# Patient Record
Sex: FEMALE | Race: WHITE | NOT HISPANIC OR LATINO
[De-identification: names, ages, dates, MRNs, and addresses within clinical notes are randomized per-mention and may not be internally consistent; named-entity substitution may affect disease eponyms.]

---

## 2017-08-21 ENCOUNTER — RESULT REVIEW (OUTPATIENT)
Age: 62
End: 2017-08-21

## 2017-08-22 ENCOUNTER — APPOINTMENT (OUTPATIENT)
Dept: OBGYN | Facility: CLINIC | Age: 62
End: 2017-08-22
Payer: COMMERCIAL

## 2017-08-22 ENCOUNTER — RESULT REVIEW (OUTPATIENT)
Age: 62
End: 2017-08-22

## 2017-08-22 PROCEDURE — 58100 BIOPSY OF UTERUS LINING: CPT

## 2018-01-09 ENCOUNTER — APPOINTMENT (OUTPATIENT)
Dept: MRI IMAGING | Facility: CLINIC | Age: 63
End: 2018-01-09
Payer: COMMERCIAL

## 2018-01-09 ENCOUNTER — OUTPATIENT (OUTPATIENT)
Dept: OUTPATIENT SERVICES | Facility: HOSPITAL | Age: 63
LOS: 1 days | End: 2018-01-09
Payer: COMMERCIAL

## 2018-01-09 DIAGNOSIS — R93.0 ABNORMAL FINDINGS ON DIAGNOSTIC IMAGING OF SKULL AND HEAD, NOT ELSEWHERE CLASSIFIED: ICD-10-CM

## 2018-01-09 DIAGNOSIS — Z98.89 OTHER SPECIFIED POSTPROCEDURAL STATES: Chronic | ICD-10-CM

## 2018-01-09 DIAGNOSIS — D25.9 LEIOMYOMA OF UTERUS, UNSPECIFIED: Chronic | ICD-10-CM

## 2018-01-09 DIAGNOSIS — Z00.8 ENCOUNTER FOR OTHER GENERAL EXAMINATION: ICD-10-CM

## 2018-01-09 PROCEDURE — 82565 ASSAY OF CREATININE: CPT

## 2018-01-09 PROCEDURE — 72197 MRI PELVIS W/O & W/DYE: CPT | Mod: 26

## 2018-01-09 PROCEDURE — A9585: CPT

## 2018-01-09 PROCEDURE — 72197 MRI PELVIS W/O & W/DYE: CPT

## 2018-01-30 ENCOUNTER — APPOINTMENT (OUTPATIENT)
Dept: OBGYN | Facility: CLINIC | Age: 63
End: 2018-01-30

## 2018-02-06 ENCOUNTER — RESULT REVIEW (OUTPATIENT)
Age: 63
End: 2018-02-06

## 2018-02-06 ENCOUNTER — APPOINTMENT (OUTPATIENT)
Dept: OBGYN | Facility: CLINIC | Age: 63
End: 2018-02-06
Payer: COMMERCIAL

## 2018-02-06 PROCEDURE — 58100 BIOPSY OF UTERUS LINING: CPT

## 2018-02-06 PROCEDURE — 58340 CATHETER FOR HYSTEROGRAPHY: CPT

## 2018-02-06 PROCEDURE — 76831 ECHO EXAM UTERUS: CPT

## 2018-11-12 ENCOUNTER — RESULT REVIEW (OUTPATIENT)
Age: 63
End: 2018-11-12

## 2018-11-13 ENCOUNTER — APPOINTMENT (OUTPATIENT)
Dept: OBGYN | Facility: CLINIC | Age: 63
End: 2018-11-13
Payer: COMMERCIAL

## 2018-11-13 PROCEDURE — 99396 PREV VISIT EST AGE 40-64: CPT

## 2019-07-18 ENCOUNTER — FORM ENCOUNTER (OUTPATIENT)
Age: 64
End: 2019-07-18

## 2019-08-06 ENCOUNTER — FORM ENCOUNTER (OUTPATIENT)
Age: 64
End: 2019-08-06

## 2019-08-21 ENCOUNTER — FORM ENCOUNTER (OUTPATIENT)
Age: 64
End: 2019-08-21

## 2019-12-02 ENCOUNTER — TRANSCRIPTION ENCOUNTER (OUTPATIENT)
Age: 64
End: 2019-12-02

## 2020-01-23 ENCOUNTER — APPOINTMENT (OUTPATIENT)
Dept: OBGYN | Facility: CLINIC | Age: 65
End: 2020-01-23
Payer: COMMERCIAL

## 2020-01-23 ENCOUNTER — RESULT REVIEW (OUTPATIENT)
Age: 65
End: 2020-01-23

## 2020-01-23 PROCEDURE — 99396 PREV VISIT EST AGE 40-64: CPT

## 2020-07-14 ENCOUNTER — FORM ENCOUNTER (OUTPATIENT)
Age: 65
End: 2020-07-14

## 2021-01-27 ENCOUNTER — FORM ENCOUNTER (OUTPATIENT)
Age: 66
End: 2021-01-27

## 2021-01-28 ENCOUNTER — APPOINTMENT (OUTPATIENT)
Dept: OBGYN | Facility: CLINIC | Age: 66
End: 2021-01-28
Payer: COMMERCIAL

## 2021-01-28 ENCOUNTER — RESULT REVIEW (OUTPATIENT)
Age: 66
End: 2021-01-28

## 2021-01-28 PROCEDURE — 99396 PREV VISIT EST AGE 40-64: CPT

## 2021-01-28 PROCEDURE — 99072 ADDL SUPL MATRL&STAF TM PHE: CPT

## 2021-02-02 ENCOUNTER — FORM ENCOUNTER (OUTPATIENT)
Age: 66
End: 2021-02-02

## 2021-02-04 ENCOUNTER — FORM ENCOUNTER (OUTPATIENT)
Age: 66
End: 2021-02-04

## 2021-07-20 ENCOUNTER — TRANSCRIPTION ENCOUNTER (OUTPATIENT)
Age: 66
End: 2021-07-20

## 2021-07-25 ENCOUNTER — TRANSCRIPTION ENCOUNTER (OUTPATIENT)
Age: 66
End: 2021-07-25

## 2022-02-08 ENCOUNTER — NON-APPOINTMENT (OUTPATIENT)
Age: 67
End: 2022-02-08

## 2022-02-08 DIAGNOSIS — Z78.9 OTHER SPECIFIED HEALTH STATUS: ICD-10-CM

## 2022-02-08 DIAGNOSIS — Z82.62 FAMILY HISTORY OF OSTEOPOROSIS: ICD-10-CM

## 2022-02-08 DIAGNOSIS — Z82.49 FAMILY HISTORY OF ISCHEMIC HEART DISEASE AND OTHER DISEASES OF THE CIRCULATORY SYSTEM: ICD-10-CM

## 2022-02-08 DIAGNOSIS — M91.10 JUVENILE OSTEOCHONDROSIS OF HEAD OF FEMUR [LEGG-CALVE-PERTHES], UNSPECIFIED LEG: ICD-10-CM

## 2022-02-08 DIAGNOSIS — Z98.890 OTHER SPECIFIED POSTPROCEDURAL STATES: ICD-10-CM

## 2022-02-08 DIAGNOSIS — Z92.89 PERSONAL HISTORY OF OTHER MEDICAL TREATMENT: ICD-10-CM

## 2022-02-08 DIAGNOSIS — Z80.3 FAMILY HISTORY OF MALIGNANT NEOPLASM OF BREAST: ICD-10-CM

## 2022-02-08 RX ORDER — ASPIRIN 81 MG
81 TABLET,CHEWABLE ORAL
Refills: 0 | Status: ACTIVE | COMMUNITY

## 2022-03-07 ENCOUNTER — APPOINTMENT (OUTPATIENT)
Dept: OBGYN | Facility: CLINIC | Age: 67
End: 2022-03-07
Payer: COMMERCIAL

## 2022-03-07 VITALS
DIASTOLIC BLOOD PRESSURE: 85 MMHG | WEIGHT: 122 LBS | SYSTOLIC BLOOD PRESSURE: 127 MMHG | HEART RATE: 49 BPM | BODY MASS INDEX: 23.03 KG/M2 | HEIGHT: 61 IN

## 2022-03-07 DIAGNOSIS — Z01.419 ENCOUNTER FOR GYNECOLOGICAL EXAMINATION (GENERAL) (ROUTINE) W/OUT ABNORMAL FINDINGS: ICD-10-CM

## 2022-03-07 DIAGNOSIS — R92.2 INCONCLUSIVE MAMMOGRAM: ICD-10-CM

## 2022-03-07 DIAGNOSIS — Z12.31 ENCOUNTER FOR SCREENING MAMMOGRAM FOR MALIGNANT NEOPLASM OF BREAST: ICD-10-CM

## 2022-03-07 DIAGNOSIS — M81.0 AGE-RELATED OSTEOPOROSIS W/OUT CURRENT PATHOLOGICAL FRACTURE: ICD-10-CM

## 2022-03-07 PROCEDURE — 99396 PREV VISIT EST AGE 40-64: CPT

## 2022-03-07 NOTE — HISTORY OF PRESENT ILLNESS
[TextBox_4] : 67yo presents for routine gyn exam.  No complaints. \par \par \par Info. from prior EMR:\par Demographics:  Race: White Ethnicity: Not  or  Native Language: English \par Referring Physician: Dr Guerra \par : 2  Para:  2 0 0 1 \par OB History:  x 2 \par GYN \par   [place] x fibroid uterus, h/o ruptured hemorrhagic cyst; denies h/o abn P/s; hx breast biopsies x 3- benign \par Menarche Age: 14 Cycle: 2-3x/yr Duration: 3-6 days Flow: normal Sexually Active  \par \par Type of Contraception: Condoms \par PMH\par  Leg-Calve-Perthes disorder, Prothrombin gene mutation Factor 2, h/o R leg DVT \par Accepts blood products \par Surgical History:  R hip replacement, Breast bx x 3 \par Allergies: None \par Current Medications: Prescribed/Suppliments/OTC BABY ASPIRIN, CALCIUM WITH D, SLEEP PILL TWICE A MONTH, STOOL SOFTENER \par Medications Verified Medications Verified \par Last PAP: 2018 -- PT AWARE PAP/HPV neg (ND 18) Last Mammo: 2019 - new 5mm hypoechoic mass left breast \par  Recommend repeat targeted sonogram\par  pt aware rx faxed  MD 19\par Last Dexa: 2019 - osteoporosis in hip (12% decrease in bone density from 2017) Last Colonoscopy:  - polyp- benign Last Breast Sono:: 2019 - benign targeted breast sono _ simple L cyst - mammo/sono in 1 year \par Family Hx\par  Diabetes: MAunt Heart Disease: Mother, father Breast Cancer: PAunt \par Fam HX comments: Osteoporosis -MOTHER  \par  Personal history of blood clots/DVT/PE: yes  \par  Personal history of conditions causing increased risk of blood clots/DVT/PE: yes  \par  Family history of blood clots/DVT/PE: no  \par  Family history of conditions causing increased risk of blood clots/DVT/PE: no  \par  [Mammogramdate] : 6/2020 [PapSmeardate] : 1/2021 [TextBox_31] : wnl [BoneDensityDate] : 2019

## 2022-03-07 NOTE — PLAN
[FreeTextEntry1] : HCM\par -SBE\par -pap & HPV\par -Rx mammo/sono\par -bone density\par -MVI, Calcium, Vit d\par -Weight/exercise\par RTO 1 year\par

## 2022-03-12 LAB
CYTOLOGY CVX/VAG DOC THIN PREP: ABNORMAL
HPV HIGH+LOW RISK DNA PNL CVX: NOT DETECTED

## 2023-03-16 ENCOUNTER — APPOINTMENT (OUTPATIENT)
Dept: OBGYN | Facility: CLINIC | Age: 68
End: 2023-03-16
Payer: COMMERCIAL

## 2023-03-16 VITALS
BODY MASS INDEX: 22.47 KG/M2 | HEIGHT: 61 IN | DIASTOLIC BLOOD PRESSURE: 72 MMHG | SYSTOLIC BLOOD PRESSURE: 124 MMHG | WEIGHT: 119 LBS

## 2023-03-16 PROCEDURE — 99397 PER PM REEVAL EST PAT 65+ YR: CPT

## 2023-03-16 NOTE — HISTORY OF PRESENT ILLNESS
[FreeTextEntry1] : LISETH PEREZ is a 67 year old presenting for annual GYN exam. Recently started Reclast for osteoporosis, otherwise doing well and has no complaints \par \par ob hx: x2\par GYN: 2010 [place] x fibroid uterus, h/o ruptured hemorrhagic cyst; denies h/o abn p/s; hx breast biopsies x3 - benign \par PMH: Leg- calve- Perthes disorder, prothrombin gene mutation factor 2, h/o R leg DVT \par PSHx:  R hip replacement, breast bx x3 [Mammogramdate] : 7/2022 [BreastSonogramDate] : 7/2022 [PapSmeardate] : 1/2021 [BoneDensityDate] : 8/2019

## 2023-03-16 NOTE — PLAN
[FreeTextEntry1] : LISETH PEREZ is a 67 year old presenting for annual GYN exam \par -Pap/HPV done today \par -BSE\par -Rx for mammo/sono \par \par RTO 1 year for annual

## 2023-03-16 NOTE — END OF VISIT
[FreeTextEntry3] : I, Barbara Benavidez, acted solely as a scribe for Dr. Alice Wilkins MD., on 03/16/2023.\par \par All medical record entries made by the scribe were at my, Dr. Alice Wilkins MD., direction and personally dictated by me on 03/16/2023. I have personally reviewed the chart and agree that the record accurately reflects my personal performance of the history, physical exam, assessment and plan.\par

## 2023-03-20 LAB
CYTOLOGY CVX/VAG DOC THIN PREP: ABNORMAL
HPV HIGH+LOW RISK DNA PNL CVX: NOT DETECTED

## 2024-03-15 ENCOUNTER — APPOINTMENT (OUTPATIENT)
Dept: OBGYN | Facility: CLINIC | Age: 69
End: 2024-03-15
Payer: COMMERCIAL

## 2024-03-15 VITALS
DIASTOLIC BLOOD PRESSURE: 62 MMHG | WEIGHT: 293 LBS | HEIGHT: 61 IN | SYSTOLIC BLOOD PRESSURE: 120 MMHG | BODY MASS INDEX: 55.32 KG/M2

## 2024-03-15 DIAGNOSIS — Z01.419 ENCOUNTER FOR GYNECOLOGICAL EXAMINATION (GENERAL) (ROUTINE) W/OUT ABNORMAL FINDINGS: ICD-10-CM

## 2024-03-15 PROCEDURE — 99397 PER PM REEVAL EST PAT 65+ YR: CPT

## 2024-03-15 NOTE — HISTORY OF PRESENT ILLNESS
[FreeTextEntry1] : 68 year old female presents for an annual. Pt is doing well with no complaints.  Also notes dx'd irregular heartbeat.  OBHx:  x2 GYN: [place] x fibroid uterus (), h/o ruptured hemorrhagic cyst, hx breast biopsies x3 - benign  PMH: Leg- calve- Perthes disorder, prothrombin gene mutation factor 2, h/o R leg DVT, osteoporosis PSHx: R hip replacement (), breast bx x3  FamHx: breast cancer - paunt [Mammogramdate] : 07/23 [BreastSonogramDate] : 07/23 [ColonoscopyDate] : 01/24

## 2024-03-15 NOTE — PLAN
[FreeTextEntry1] : 68 year old female presents for an annual  -PAP done -Rx breast mammo/sono -Rx DEXA -Colon utd  RTO in 1 year

## 2024-03-15 NOTE — END OF VISIT
[FreeTextEntry3] : I, Sowmya Raj, acted as a scribe on behalf of Dr. Alice Wilkins D.O. on 03/15/2024.  All medical entries made by the scribe were at my, Dr. Alice Wilkins D.O, direction and personally dictated by me on 03/15/2024. I have reviewed the chart and agree that the record accurately reflects my personal performance of the history, physical exam, assessment and plan. I have also personally directed, reviewed, and agreed with the chart.

## 2024-03-18 LAB — HPV HIGH+LOW RISK DNA PNL CVX: NOT DETECTED

## 2024-03-19 LAB — CYTOLOGY CVX/VAG DOC THIN PREP: ABNORMAL

## 2024-11-25 ENCOUNTER — NON-APPOINTMENT (OUTPATIENT)
Age: 69
End: 2024-11-25

## 2025-03-24 ENCOUNTER — APPOINTMENT (OUTPATIENT)
Dept: OBGYN | Facility: CLINIC | Age: 70
End: 2025-03-24
Payer: COMMERCIAL

## 2025-03-24 ENCOUNTER — NON-APPOINTMENT (OUTPATIENT)
Age: 70
End: 2025-03-24

## 2025-03-24 VITALS
DIASTOLIC BLOOD PRESSURE: 77 MMHG | SYSTOLIC BLOOD PRESSURE: 133 MMHG | HEIGHT: 61 IN | BODY MASS INDEX: 23.41 KG/M2 | WEIGHT: 124 LBS

## 2025-03-24 DIAGNOSIS — Z01.419 ENCOUNTER FOR GYNECOLOGICAL EXAMINATION (GENERAL) (ROUTINE) W/OUT ABNORMAL FINDINGS: ICD-10-CM

## 2025-03-24 PROCEDURE — 99459 PELVIC EXAMINATION: CPT

## 2025-03-24 PROCEDURE — 99397 PER PM REEVAL EST PAT 65+ YR: CPT

## 2025-03-26 LAB — HPV HIGH+LOW RISK DNA PNL CVX: NOT DETECTED

## 2025-03-28 LAB — CYTOLOGY CVX/VAG DOC THIN PREP: ABNORMAL
